# Patient Record
(demographics unavailable — no encounter records)

---

## 2025-04-28 NOTE — HISTORY OF PRESENT ILLNESS
[de-identified] : Status post right reverse total shoulder arthroplasty for displaced four-part proximal humerus fracture dislocation on 3/6/2025. [de-identified] : This is a 51-year-old male that sustained a right proximal humerus fracture dislocation and underwent right reverse total shoulder arthroplasty on 3/6/2025.  This injury occurred as a result of a first-time seizure and fall.  He also sustained some nonoperative spinal fractures which are being treated by an outside surgeon.  He presents on an urgent basis due to increased pain and stiffness x 2 days that started after he begin pully exercises in PT. He was concerned that he had dislocated his prosthesis.  [de-identified] : Physical exam the right shoulder. Surgical incision is fully healed there is no erythema drainage or induration  there is no swelling no ecchymosis. Sensation intact light touch throughout the right upper extremity AX, M, U, R distributions. THere is exuisite tenderness over the anterior shoulder in the area of the biceps tendon.  minimal AROM of the R shoulder due to pain/ apprehension. PROM of the R shoulder 0-70 degrees FF 0-50 degrees abduction.He is able to flex and extend his elbow wrist and fingers normal neurovascular exam [de-identified] : 2 views R shoulder taken today and reviewed by me show well seated and appropriately positioned R reverse total shoulder arthroplasty with all components in excellent position and no signs of hardware failure.  [de-identified] : Status post right reverse total shoulder arthroplasty for displaced four-part proximal humerus fracture dislocation on 3/6/2025. [de-identified] : Patient with acute tendonitis of R shoulder post therapy. X-rays show no change in position or alignment from prior radiographs. Hold off on PT due to acute pain. Diclofenac BID sent to pharmacy.  May resume PT when acute pain has subsided. may use sling for rest/ comfort. F/U in 2 weeks for routine followup.

## 2025-04-28 NOTE — HISTORY OF PRESENT ILLNESS
[de-identified] : Status post right reverse total shoulder arthroplasty for displaced four-part proximal humerus fracture dislocation on 3/6/2025. [de-identified] : This is a 51-year-old male that sustained a right proximal humerus fracture dislocation and underwent right reverse total shoulder arthroplasty on 3/6/2025.  This injury occurred as a result of a first-time seizure and fall.  He also sustained some nonoperative spinal fractures which are being treated by an outside surgeon.  He presents on an urgent basis due to increased pain and stiffness x 2 days that started after he begin pully exercises in PT. He was concerned that he had dislocated his prosthesis.  [de-identified] : Physical exam the right shoulder. Surgical incision is fully healed there is no erythema drainage or induration  there is no swelling no ecchymosis. Sensation intact light touch throughout the right upper extremity AX, M, U, R distributions. THere is exuisite tenderness over the anterior shoulder in the area of the biceps tendon.  minimal AROM of the R shoulder due to pain/ apprehension. PROM of the R shoulder 0-70 degrees FF 0-50 degrees abduction.He is able to flex and extend his elbow wrist and fingers normal neurovascular exam [de-identified] : 2 views R shoulder taken today and reviewed by me show well seated and appropriately positioned R reverse total shoulder arthroplasty with all components in excellent position and no signs of hardware failure.  [de-identified] : Status post right reverse total shoulder arthroplasty for displaced four-part proximal humerus fracture dislocation on 3/6/2025. [de-identified] : Patient with acute tendonitis of R shoulder post therapy. X-rays show no change in position or alignment from prior radiographs. Hold off on PT due to acute pain. Diclofenac BID sent to pharmacy.  May resume PT when acute pain has subsided. may use sling for rest/ comfort. F/U in 2 weeks for routine followup.

## 2025-05-08 NOTE — HISTORY OF PRESENT ILLNESS
[de-identified] : Status post right reverse total shoulder arthroplasty for displaced four-part proximal humerus fracture dislocation on 3/6/2025. [de-identified] : This is a 51-year-old male that sustained a right proximal humerus fracture dislocation and underwent right reverse total shoulder arthroplasty on 3/6/2025.  This injury occurred as a result of a first-time seizure and fall.  He also sustained some nonoperative spinal fractures which are being treated by an outside surgeon.  He was seen 1 week ago due to significant increased pain after physical therapy, he was concerned that his shoulder was dislocated.  X-rays at that time showed no dislocation of the prosthesis nor hardware in good position.  He continues to be in pain compared to baseline a few weeks ago.  However his pain is improved and his function is mildly improved as well compared to exam last week [de-identified] : Physical exam the right shoulder. Surgical incision is fully healed there is no erythema drainage or induration  there is no swelling no ecchymosis. Sensation intact light touch throughout the right upper extremity AX, M, U, R distributions. THere is exuisite tenderness over the anterior shoulder in the area of the biceps tendon.  minimal AROM of the R shoulder due to pain/ apprehension. PROM of the R shoulder 0-50 degrees FF 0-30 degrees abduction Limited secondary to apprehension..He is able to flex and extend his elbow wrist and fingers normal neurovascular exam [de-identified] : The CT scan is reviewed with the patient.  The arthroplasty appears appropriately positioned with no changes from immediate postoperative imaging.  There are no signs of leo-implant fracture.  There are some small comminuted fragments inferior to the glenoid which represent typical postoperative findings in the setting of fracture indication for arthroplasty [de-identified] : Status post right reverse total shoulder arthroplasty for displaced four-part proximal humerus fracture dislocation on 3/6/2025. [de-identified] : The CT scan shows no significant findings.  The patient is allowed to resume physical therapy.  He will continue to take anti-inflammatory medication as needed.  We will see him back in 2 weeks